# Patient Record
(demographics unavailable — no encounter records)

---

## 2024-10-14 NOTE — HISTORY OF PRESENT ILLNESS
[Left Arm] : left arm [10] : 10 [7] : 7 [Localized] : localized [Constant] : constant [Meds] : meds [Walking] : walking [Bending forward] : bending forward [Stairs] : stairs [de-identified] : 05/13/2024: CELSO FERNANDO is a 27 year y.o. female here today for left arm and left leg pain. Onset: 05/10/2024. Pt reports that she fell in the gap [place] the train and platform, landing on her left arm. Pt visited [facility] where she completed X-Rays and an US. She has been taking Tylenol as needed for relief, pt describes her pain as "pressure'. Pain is localized to affected areas, no numbness/tingling, no radiating pain.   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 pt states pain decreased.  08/05/2024: Patient reports WC has denied PT, hasn't been able to start. Patient reports no changes in symptoms since last visit.  10/14/2024 CELSO  is here today to follow up on left knee. Patient reports no changes with pain. Patient is not doing PT, she had been taken Motrin as needed for pain relief. Still not working.        [] : no [FreeTextEntry1] : left knee  [FreeTextEntry6] : pressue  [FreeTextEntry9] : Tylenol

## 2024-10-14 NOTE — IMAGING
[de-identified] : LEFT KNEE EXAM Alignment: Neutral  Effusion: None Atrophy: None   Ecchymosis:  Medially.        Guarding on exam.                                           Stable to Varus/valgus stress.  Pain with mcl stress.  Posterior Drawer Test: negative Anterior Drawer Test: Negative Knee Extension/Flexion: 0 / 90   Medial/lateral compartments Medial joint line: POS and along MCL Lateral joint line: No Tenderness Irene test: POS  Patellofemoral joint Medial patellar facet: no tenderness Patellar grind: Negative  Tendons: Pes Anserine: POS tenderness Gerdys Tubercle/ IT Band: No tenderness Quadriceps Tendon: No Tenderness patellar tendon: no Tenderness Tibial tubercle: not tenderness Calf: no Tenderness  Neurovascular exam Muscle strength: 5/5 Sensation to light touch: intact Distal pulses: 2+  [id number] Xrays of the Left Knee were taken demonstrating unremarkable.  No acute fx, dislocations noted.  MRI of the right knee in EHR - MPFL ligament sprain, slight lateral subluxation, and slight patellofemoral effusion  LEFT ELBOW  Ecchymosis present distal 1/3 posterior upper arm.  No palpable defect noted.  ROM full. Strength okay.  Ligamentously stable.  2024  Xrays of left elbow were taken demonstrating unremarkable.  No acute fx, dislocations noted.

## 2024-10-14 NOTE — ASSESSMENT
[FreeTextEntry1] : 2024:  L elbow and L knee contusion/internal derangement after fall at work on 5/10/24.  - Patient very guarded on knee exam.  Will plan on stat MRI left knee to assess possible occult fx, , mcl sprain, mmt, lig tear - Rest, ice, nsaids prn - Follow up with results.  2024 with left knee PFS and pes bursitis start PT  6-8 week follow up  I am writing this as a letter of medical necessity for PT program.  Pt has tried analgesics, NSAIDs, rest and ice which in combination or by themselves has not worked. Based on my patient's condition, I strongly believe that PT is medically necessary.  2024  Physical therapy has not yet been approved. HEP provided and new PT script provided  I am writing this as a letter of medical necessity for PT program.  Pt has tried analgesics, NSAIDs, rest and ice which in combination or by themselves has not worked. Based on my patient's condition, I strongly believe that PT is medically necessary.  10/14/2024:  Still awaiting for  to approve the PT Continue NSAIDs Follow up 4-6 weeks  Time Based billin minutes was spent with the patient today taking the patient's history, conducting a physical examination, reviewing imaging studies, and  detailed discussion regarding the diagnosis and treatment plan.

## 2024-11-25 NOTE — IMAGING
[de-identified] : LEFT KNEE EXAM Alignment: Neutral  Effusion: None Atrophy: None   Ecchymosis:  Medially.        Guarding on exam.                                           Stable to Varus/valgus stress.  Pain with mcl stress.  Posterior Drawer Test: negative Anterior Drawer Test: Negative Knee Extension/Flexion: 0 / 90   Medial/lateral compartments Medial joint line: POS and along MCL Lateral joint line: No Tenderness Irene test: POS  Patellofemoral joint Medial patellar facet: no tenderness Patellar grind: Negative  Tendons: Pes Anserine: POS tenderness Gerdys Tubercle/ IT Band: No tenderness Quadriceps Tendon: No Tenderness patellar tendon: no Tenderness Tibial tubercle: not tenderness Calf: no Tenderness  Neurovascular exam Muscle strength: 5/5 Sensation to light touch: intact Distal pulses: 2+  [id number] Xrays of the Left Knee were taken demonstrating unremarkable.  No acute fx, dislocations noted.  MRI of the right knee in EHR - MPFL ligament sprain, slight lateral subluxation, and slight patellofemoral effusion  LEFT ELBOW  Ecchymosis present distal 1/3 posterior upper arm.  No palpable defect noted.  ROM full. Strength okay.  Ligamentously stable.  2024  Xrays of left elbow were taken demonstrating unremarkable.  No acute fx, dislocations noted.

## 2024-11-25 NOTE — HISTORY OF PRESENT ILLNESS
[Not working due to injury] : Work status: not working due to injury [] : yes [de-identified] : 05/13/2024: CELSO FERNANDO is a 27 year y.o. female here today for left arm and left leg pain. Onset: 05/10/2024. Pt reports that she fell in the gap [place] the train [place] platform, landing on her left arm. Pt visited [facility] where she completed X-Rays and an US. She has been taking Tylenol as needed for relief, pt describes her pain as "pressure'. Pain is localized to affected areas, no numbness/tingling, no radiating pain.   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 pt states pain decreased.  08/05/2024: Patient reports WC has denied PT, hasn't been able to start. Patient reports no changes in symptoms since last visit.  10/14/2024 CELSO  is here today to follow up on left knee. Patient reports no changes with pain. Patient is not doing PT, she had been taken Motrin as needed for pain relief. Still not working.    11/25/2024: CELSO is here today for left knee follow up. PT was approved, completed 2 sessions so far. taking Motrin PRN pain. still OOW.

## 2024-11-25 NOTE — ASSESSMENT
[FreeTextEntry1] : 05/13/2024:  L elbow and L knee contusion/internal derangement after fall at work on 5/10/24.  - Patient very guarded on knee exam.  Will plan on stat MRI left knee to assess possible occult fx, , mcl sprain, mmt, lig tear - Rest, ice, nsaids prn - Follow up with results.  06/24/2024 with left knee PFS and pes bursitis start PT  6-8 week follow up  I am writing this as a letter of medical necessity for PT program.  Pt has tried analgesics, NSAIDs, rest and ice which in combination or by themselves has not worked. Based on my patient's condition, I strongly believe that PT is medically necessary.  08/05/2024  Physical therapy has not yet been approved. HEP provided and new PT script provided  I am writing this as a letter of medical necessity for PT program.  Pt has tried analgesics, NSAIDs, rest and ice which in combination or by themselves has not worked. Based on my patient's condition, I strongly believe that PT is medically necessary.  10/14/2024:  Still awaiting for  to approve the PT Continue NSAIDs Follow up 4-6 weeks  11/25/2024:  Continue PT Follow up in 4-6 weeks, will consider RTW once completes full course of PT

## 2025-01-06 NOTE — IMAGING
[de-identified] : LEFT KNEE EXAM Alignment: Neutral  Effusion: None Atrophy: None   Ecchymosis:  Medially.        Guarding on exam.                                           Stable to Varus/valgus stress.  Pain with mcl stress.  Posterior Drawer Test: negative Anterior Drawer Test: Negative Knee Extension/Flexion: 0 / 90   Medial/lateral compartments Medial joint line: POS and along MCL Lateral joint line: No Tenderness Irene test: POS  Patellofemoral joint Medial patellar facet: no tenderness Patellar grind: Negative  Tendons: Pes Anserine: POS tenderness Gerdys Tubercle/ IT Band: No tenderness Quadriceps Tendon: No Tenderness patellar tendon: no Tenderness Tibial tubercle: not tenderness Calf: no Tenderness  Neurovascular exam Muscle strength: 5/5 Sensation to light touch: intact Distal pulses: 2+  [id number] Xrays of the Left Knee were taken demonstrating unremarkable.  No acute fx, dislocations noted.  MRI of the right knee in EHR - MPFL ligament sprain, slight lateral subluxation, and slight patellofemoral effusion  LEFT ELBOW  Ecchymosis present distal 1/3 posterior upper arm.  No palpable defect noted.  ROM full. Strength okay.  Ligamentously stable.  2024  Xrays of left elbow were taken demonstrating unremarkable.  No acute fx, dislocations noted.

## 2025-01-06 NOTE — HISTORY OF PRESENT ILLNESS
[Not working due to injury] : Work status: not working due to injury [] : yes [de-identified] : 05/13/2024: CELSO FERNANDO is a 27 year y.o. female here today for left arm and left leg pain. Onset: 05/10/2024. Pt reports that she fell [place] the gap [place] train [place] platform, landing on her left arm. Pt visited [facility] where she completed X-Rays and an US. She has been taking Tylenol as needed for relief, pt describes her pain as "pressure'. Pain is localized to affected areas, no numbness/tingling, no radiating pain.   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 pt states pain decreased.  08/05/2024: Patient reports WC has denied PT, hasn't been able to start. Patient reports no changes in symptoms since last visit.  10/14/2024 CELSO  is here today to follow up on left knee. Patient reports no changes with pain. Patient is not doing PT, she had been taken Motrin as needed for pain relief. Still not working.    11/25/2024: CELSO is here today for left knee follow up. PT was approved, completed 2 sessions so far. taking Motrin PRN pain. still OOW.   01/06/2025: patient is here today to follow up left knee. she reports improvement pt notes that she is doing physical therapy 2x weekly missed 2 weeks due to being sick.

## 2025-01-06 NOTE — ASSESSMENT
[FreeTextEntry1] : 05/13/2024:  L elbow and L knee contusion/internal derangement after fall at work on 5/10/24.  - Patient very guarded on knee exam.  Will plan on stat MRI left knee to assess possible occult fx, , mcl sprain, mmt, lig tear - Rest, ice, nsaids prn - Follow up with results.  06/24/2024 with left knee PFS and pes bursitis start PT  6-8 week follow up  I am writing this as a letter of medical necessity for PT program.  Pt has tried analgesics, NSAIDs, rest and ice which in combination or by themselves has not worked. Based on my patient's condition, I strongly believe that PT is medically necessary.  08/05/2024  Physical therapy has not yet been approved. HEP provided and new PT script provided  I am writing this as a letter of medical necessity for PT program.  Pt has tried analgesics, NSAIDs, rest and ice which in combination or by themselves has not worked. Based on my patient's condition, I strongly believe that PT is medically necessary.  10/14/2024:  Still awaiting for  to approve the PT Continue NSAIDs Follow up 4-6 weeks  11/25/2024:  Continue PT Follow up in 4-6 weeks, will consider RTW once completes full course of PT  01/06/2025: Improved from PT Ok to RTW 1/14/25 to work Follow up as needed